# Patient Record
Sex: FEMALE | Race: WHITE | URBAN - NONMETROPOLITAN AREA
[De-identification: names, ages, dates, MRNs, and addresses within clinical notes are randomized per-mention and may not be internally consistent; named-entity substitution may affect disease eponyms.]

---

## 2017-05-11 ENCOUNTER — OFFICE VISIT (OUTPATIENT)
Dept: FAMILY MEDICINE CLINIC | Facility: CLINIC | Age: 22
End: 2017-05-11

## 2017-05-11 VITALS
BODY MASS INDEX: 29 KG/M2 | TEMPERATURE: 98 F | OXYGEN SATURATION: 99 % | SYSTOLIC BLOOD PRESSURE: 122 MMHG | DIASTOLIC BLOOD PRESSURE: 76 MMHG | WEIGHT: 177 LBS | HEART RATE: 68 BPM

## 2017-05-11 DIAGNOSIS — J01.40 ACUTE NON-RECURRENT PANSINUSITIS: Primary | ICD-10-CM

## 2017-05-11 PROCEDURE — 99213 OFFICE O/P EST LOW 20 MIN: CPT | Performed by: FAMILY MEDICINE

## 2017-05-11 RX ORDER — AMOXICILLIN 875 MG/1
875 TABLET, COATED ORAL 2 TIMES DAILY
Qty: 20 TABLET | Refills: 0 | Status: SHIPPED
Start: 2017-05-11 | End: 2017-05-21

## 2017-05-11 NOTE — PATIENT INSTRUCTIONS
Push fluids, nasal saline ad garland. , if in adequate congestion relief, may use Afrin 12 hour decongestant nasal spray or equivalent twice daily for the next 3 days  Amoxicillin 875 mg twice daily ×10 days, take with food

## 2017-05-11 NOTE — PROGRESS NOTES
HPI:   Rockwell Boxer is a 25year old female who presents for upper respiratory symptoms for  5  days. Patient reports sore throat, congestion, dry cough, cough is not keeping pt up at night, ear pain, sinus pain.       No current outpatient prescriptions nasal saline ad garland. , if in adequate congestion relief, may use Afrin 12 hour decongestant nasal spray or equivalent twice daily for the next 3 days  Amoxicillin 875 mg twice daily ×10 days, take with food    symptomatic treatment reviewed  Infection contr

## 2017-05-18 ENCOUNTER — OFFICE VISIT (OUTPATIENT)
Dept: FAMILY MEDICINE CLINIC | Facility: CLINIC | Age: 22
End: 2017-05-18

## 2017-05-18 VITALS
WEIGHT: 175.38 LBS | SYSTOLIC BLOOD PRESSURE: 122 MMHG | BODY MASS INDEX: 29 KG/M2 | TEMPERATURE: 98 F | DIASTOLIC BLOOD PRESSURE: 60 MMHG

## 2017-05-18 DIAGNOSIS — N39.0 URINARY TRACT INFECTION, SITE UNSPECIFIED: Primary | ICD-10-CM

## 2017-05-18 PROCEDURE — 81003 URINALYSIS AUTO W/O SCOPE: CPT | Performed by: INTERNAL MEDICINE

## 2017-05-18 PROCEDURE — 87186 SC STD MICRODIL/AGAR DIL: CPT | Performed by: INTERNAL MEDICINE

## 2017-05-18 PROCEDURE — 99214 OFFICE O/P EST MOD 30 MIN: CPT | Performed by: INTERNAL MEDICINE

## 2017-05-18 PROCEDURE — 87088 URINE BACTERIA CULTURE: CPT | Performed by: INTERNAL MEDICINE

## 2017-05-18 PROCEDURE — 87086 URINE CULTURE/COLONY COUNT: CPT | Performed by: INTERNAL MEDICINE

## 2017-05-18 RX ORDER — SULFAMETHOXAZOLE AND TRIMETHOPRIM 800; 160 MG/1; MG/1
1 TABLET ORAL 2 TIMES DAILY
Qty: 6 TABLET | Refills: 0 | Status: SHIPPED | OUTPATIENT
Start: 2017-05-18 | End: 2017-06-09 | Stop reason: ALTCHOICE

## 2017-05-19 NOTE — PROGRESS NOTES
Mana Coppola is a 25year old female. HPI:   Patient presents with symptoms of UTI. Complaining of urinary frequency, urgency, and dysuria. Denies back pain, fever, hematuria.       Current Outpatient Prescriptions:  Sulfamethoxazole-TMP DS (BACTRIM DS)

## 2017-06-08 ENCOUNTER — TELEPHONE (OUTPATIENT)
Dept: FAMILY MEDICINE CLINIC | Facility: CLINIC | Age: 22
End: 2017-06-08

## 2017-06-08 NOTE — TELEPHONE ENCOUNTER
Mom states that pt cannot make the previously scheduled 11:30 appointment.  Mom requested a 2pm or later appointment  Future Appointments  Date Time Provider Sushila Reyes   6/9/2017 2:00 PM Gill Chau DO [facility] [place]

## 2017-06-09 ENCOUNTER — OFFICE VISIT (OUTPATIENT)
Dept: FAMILY MEDICINE CLINIC | Facility: CLINIC | Age: 22
End: 2017-06-09

## 2017-06-09 VITALS
DIASTOLIC BLOOD PRESSURE: 80 MMHG | BODY MASS INDEX: 29 KG/M2 | WEIGHT: 177 LBS | OXYGEN SATURATION: 98 % | HEART RATE: 70 BPM | TEMPERATURE: 98 F | SYSTOLIC BLOOD PRESSURE: 110 MMHG

## 2017-06-09 DIAGNOSIS — R39.15 URINARY URGENCY: Primary | ICD-10-CM

## 2017-06-09 PROCEDURE — 81003 URINALYSIS AUTO W/O SCOPE: CPT | Performed by: FAMILY MEDICINE

## 2017-06-09 PROCEDURE — 87086 URINE CULTURE/COLONY COUNT: CPT | Performed by: FAMILY MEDICINE

## 2017-06-09 PROCEDURE — 99213 OFFICE O/P EST LOW 20 MIN: CPT | Performed by: FAMILY MEDICINE

## 2017-06-09 RX ORDER — NITROFURANTOIN 25; 75 MG/1; MG/1
100 CAPSULE ORAL 2 TIMES DAILY
Qty: 14 CAPSULE | Refills: 0 | Status: SHIPPED | OUTPATIENT
Start: 2017-06-09 | End: 2017-08-03 | Stop reason: ALTCHOICE

## 2017-06-09 NOTE — PROGRESS NOTES
Dawn Oswald is a 25year old female. Patient presents with: Other: urinary frequency, urgency--started last night at work. .... [facility] room 3      HPI:     Patient presents with symptoms of UTI.  Complaining of urinary frequency, urgency, dysuria, suprapubic p increasing fluid intake and bladder emptying . The patient indicates understanding of these issues and agrees to the plan. The patient is asked to return in 3 days if not better. Call if fever, vomiting, worsening symptoms. Urine sent for culture . Mateusz moran

## 2017-06-12 NOTE — PROGRESS NOTES
Quick Note:    Notify urine culture negative. Discontinue antibiotics.   RTC if still symptomatic.    ______

## 2017-08-03 ENCOUNTER — OFFICE VISIT (OUTPATIENT)
Dept: FAMILY MEDICINE CLINIC | Facility: CLINIC | Age: 22
End: 2017-08-03

## 2017-08-03 ENCOUNTER — TELEPHONE (OUTPATIENT)
Dept: FAMILY MEDICINE CLINIC | Facility: CLINIC | Age: 22
End: 2017-08-03

## 2017-08-03 VITALS
OXYGEN SATURATION: 95 % | WEIGHT: 170.13 LBS | BODY MASS INDEX: 27.67 KG/M2 | HEIGHT: 65.75 IN | TEMPERATURE: 98 F | SYSTOLIC BLOOD PRESSURE: 120 MMHG | DIASTOLIC BLOOD PRESSURE: 78 MMHG | HEART RATE: 86 BPM

## 2017-08-03 DIAGNOSIS — B30.9 ACUTE VIRAL CONJUNCTIVITIS OF BOTH EYES: Primary | ICD-10-CM

## 2017-08-03 PROCEDURE — 99213 OFFICE O/P EST LOW 20 MIN: CPT | Performed by: FAMILY MEDICINE

## 2017-08-03 RX ORDER — TOBRAMYCIN AND DEXAMETHASONE 3; 1 MG/ML; MG/ML
2 SUSPENSION/ DROPS OPHTHALMIC
Qty: 1 BOTTLE | Refills: 0 | Status: SHIPPED | OUTPATIENT
Start: 2017-08-03 | End: 2017-10-27 | Stop reason: ALTCHOICE

## 2017-08-03 NOTE — TELEPHONE ENCOUNTER
Mom states that pt had some redness and oozing out of the right eye. Mom states that this morning the eye was crusted over and is very itchy. Mom is requesting pt be seen by Dr. Brinda Hudson or available provider.   Mom advised that message will be forwarded to

## 2017-08-03 NOTE — TELEPHONE ENCOUNTER
Future Appointments  Date Time Provider Sushila Reyes   8/3/2017 9:45 AM Pedro Sample, DO [facility] [place]

## 2017-08-03 NOTE — PROGRESS NOTES
HPI:    Patient ID: Aminah Morgan is a 25year old female. Eye drainage / matting  Pain  X 2 days  HPI    Review of Systems   Constitutional: Negative for chills and fever. HENT: Positive for rhinorrhea. Negative for sore throat.     Respiratory: Negati

## 2017-08-15 ENCOUNTER — TELEPHONE (OUTPATIENT)
Dept: FAMILY MEDICINE CLINIC | Facility: CLINIC | Age: 22
End: 2017-08-15

## 2017-08-15 RX ORDER — TOBRAMYCIN 3 MG/ML
SOLUTION/ DROPS OPHTHALMIC
Qty: 5 ML | Refills: 0 | Status: SHIPPED | OUTPATIENT
Start: 2017-08-15 | End: 2017-10-27 | Stop reason: ALTCHOICE

## 2017-08-15 NOTE — TELEPHONE ENCOUNTER
meds for pink eye to [facility] in [place]    Patient is done with drops but feels like she still has pink eye

## 2017-08-15 NOTE — TELEPHONE ENCOUNTER
Pt saw Dr. Citlalli Tomas on 8/3 for conjunctivitis. Pt used tobramycin-dexamethasone 2 gtts in B eyes q4hrs w/a U1CWFR. Mom reports sx resolved, but then returned yesterday. Eye red and with \"ggopy\" drng.   Pt used leftover eye gtts last night w/ minimal im

## 2017-08-15 NOTE — TELEPHONE ENCOUNTER
Okay for Tobrex ophthalmic solution 1 drop affected eye 4 times daily for up to 5 days, 5 mL's, if redness and irritation persists, I would like to see her or she should see her eye doctor.   Do not wear contacts until completely resolved

## 2017-10-27 ENCOUNTER — OFFICE VISIT (OUTPATIENT)
Dept: FAMILY MEDICINE CLINIC | Facility: CLINIC | Age: 22
End: 2017-10-27

## 2017-10-27 ENCOUNTER — LAB ENCOUNTER (OUTPATIENT)
Dept: LAB | Age: 22
End: 2017-10-27
Attending: FAMILY MEDICINE
Payer: COMMERCIAL

## 2017-10-27 VITALS
TEMPERATURE: 98 F | DIASTOLIC BLOOD PRESSURE: 60 MMHG | BODY MASS INDEX: 28.13 KG/M2 | SYSTOLIC BLOOD PRESSURE: 118 MMHG | HEART RATE: 70 BPM | OXYGEN SATURATION: 99 % | HEIGHT: 66 IN | WEIGHT: 175 LBS

## 2017-10-27 DIAGNOSIS — Z13.1 SCREENING FOR DIABETES MELLITUS: ICD-10-CM

## 2017-10-27 DIAGNOSIS — Z13.29 SCREENING FOR THYROID DISORDER: ICD-10-CM

## 2017-10-27 DIAGNOSIS — R11.0 NAUSEA: ICD-10-CM

## 2017-10-27 DIAGNOSIS — R53.83 FATIGUE, UNSPECIFIED TYPE: ICD-10-CM

## 2017-10-27 DIAGNOSIS — R53.83 FATIGUE, UNSPECIFIED TYPE: Primary | ICD-10-CM

## 2017-10-27 PROCEDURE — 99213 OFFICE O/P EST LOW 20 MIN: CPT | Performed by: FAMILY MEDICINE

## 2017-10-27 PROCEDURE — 83036 HEMOGLOBIN GLYCOSYLATED A1C: CPT | Performed by: FAMILY MEDICINE

## 2017-10-27 PROCEDURE — 80050 GENERAL HEALTH PANEL: CPT | Performed by: FAMILY MEDICINE

## 2017-10-27 PROCEDURE — 36415 COLL VENOUS BLD VENIPUNCTURE: CPT | Performed by: FAMILY MEDICINE

## 2017-10-27 NOTE — PROGRESS NOTES
Yaniv Solorzano is a 23year old female. Patient presents with:  Fatigue: FEELS \"SICK\" WHEN SHE EATS SUGAR-  her with mother, would like blood work  HPI:   Tired the past few weeks, feels \"sick\" when eating sugar, nausea, coffee, ice tea  No change in alyse without murmur, peripheral pulses intact  GI: good BS's,no masses, HSM or tenderness  Psych: Good eye contact, bright affect, good insight, need appearance  ASSESSMENT AND PLAN:   Fatigue, unspecified type  (primary encounter diagnosis)  Nausea  Screening

## 2017-10-27 NOTE — PATIENT INSTRUCTIONS
I reviewed various causes of fatigue including metabolic disorders, stress, sleep-related disorders, etc.  We will check fasting CMP, CBC, TSH and hemoglobin A1c  Discussed dietary modification to avoid simple carbohydrates.   Would recommend a trial of the

## 2018-05-16 ENCOUNTER — OFFICE VISIT (OUTPATIENT)
Dept: FAMILY MEDICINE CLINIC | Facility: CLINIC | Age: 23
End: 2018-05-16

## 2018-05-16 VITALS
BODY MASS INDEX: 25.68 KG/M2 | TEMPERATURE: 98 F | SYSTOLIC BLOOD PRESSURE: 120 MMHG | HEIGHT: 65.5 IN | DIASTOLIC BLOOD PRESSURE: 68 MMHG | WEIGHT: 156 LBS | OXYGEN SATURATION: 99 % | HEART RATE: 74 BPM

## 2018-05-16 DIAGNOSIS — Z11.1 SCREENING FOR TUBERCULOSIS: ICD-10-CM

## 2018-05-16 DIAGNOSIS — Z02.0 SCHOOL PHYSICAL EXAM: Primary | ICD-10-CM

## 2018-05-16 DIAGNOSIS — M22.2X1 PATELLOFEMORAL ARTHRALGIA OF BOTH KNEES: ICD-10-CM

## 2018-05-16 DIAGNOSIS — M22.2X2 PATELLOFEMORAL ARTHRALGIA OF BOTH KNEES: ICD-10-CM

## 2018-05-16 PROCEDURE — 99395 PREV VISIT EST AGE 18-39: CPT | Performed by: FAMILY MEDICINE

## 2018-05-16 PROCEDURE — 86580 TB INTRADERMAL TEST: CPT | Performed by: FAMILY MEDICINE

## 2018-05-16 NOTE — H&P
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     Wt Readings from Last 6 Encounters:  05/16/18 : 156 lb  10/27/17 : 175 lb  08/03/17 : 170 lb 2 oz (92 %, Z= 1.38)* denies back pain, +bilateral knee discomfort when squatting  NEURO: denies headaches, tremors, dizziness, numbness, tingling, muscular weakness  PSYCHE: denies depression or anxiety, denies any sleep difficulty  HEMATOLOGIC: denies unexplained bruising or intradermal test  Meds & Refills for this Visit:  No prescriptions requested or ordered in this encounter  Imaging & Consults:  None  No Follow-up on file. Patient Instructions   I reviewed age-appropriate immunizations.   I discussed HPV infection and vac

## 2018-05-16 NOTE — PATIENT INSTRUCTIONS
I reviewed age-appropriate immunizations. I discussed HPV infection and vaccine, hepatitis A infection vaccine, patient reminded she will be due for a Tdap booster within the next year.   Patient wishes to defer all vaccines today but would consider hepati

## 2018-05-23 ENCOUNTER — NURSE ONLY (OUTPATIENT)
Dept: FAMILY MEDICINE CLINIC | Facility: CLINIC | Age: 23
End: 2018-05-23

## 2018-05-23 DIAGNOSIS — Z11.1 SCREENING FOR TUBERCULOSIS: Primary | ICD-10-CM

## 2018-05-23 PROCEDURE — 86580 TB INTRADERMAL TEST: CPT | Performed by: FAMILY MEDICINE

## 2018-05-25 ENCOUNTER — TELEPHONE (OUTPATIENT)
Dept: FAMILY MEDICINE CLINIC | Facility: CLINIC | Age: 23
End: 2018-05-25

## 2018-05-25 NOTE — TELEPHONE ENCOUNTER
See  below. Pt was scheduled for her 2nd PPD reading today, but got stuck at work, and can't come in tomorrow either. She will have a nurse @ [address] [place] [address] Dr document her reading. IF SHE BRINGS THAT IN, CAN WE TRANSFER IT ONTO PT'S FORM FOR SCHOOL?   *IF SO, THE

## 2018-05-25 NOTE — TELEPHONE ENCOUNTER
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

## 2018-06-06 ENCOUNTER — NURSE ONLY (OUTPATIENT)
Dept: FAMILY MEDICINE CLINIC | Facility: CLINIC | Age: 23
End: 2018-06-06

## 2018-06-06 DIAGNOSIS — Z11.1 SCREENING FOR TUBERCULOSIS: Primary | ICD-10-CM

## 2018-06-06 PROCEDURE — 86580 TB INTRADERMAL TEST: CPT | Performed by: FAMILY MEDICINE

## 2018-06-06 NOTE — TELEPHONE ENCOUNTER
Pt came into our ofc on 6/6. States she did NOT have her 2nd step PPD read at work as discussed in note below. PT ASKS TO HAVE IT PLACED AGAIN TODAY.    PT'S PAPERWORK DAYS \"A 2-STEP REQUIRES 2 TUBERCULIN INJECTIONS REPEATED BETWEEN 1-3 WEEKS\" AND \"#2

## 2018-06-08 ENCOUNTER — NURSE ONLY (OUTPATIENT)
Dept: FAMILY MEDICINE CLINIC | Facility: CLINIC | Age: 23
End: 2018-06-08

## 2018-06-08 DIAGNOSIS — Z11.1 SCREENING FOR TUBERCULOSIS: Primary | ICD-10-CM

## 2018-10-05 ENCOUNTER — OFFICE VISIT (OUTPATIENT)
Dept: FAMILY MEDICINE CLINIC | Facility: CLINIC | Age: 23
End: 2018-10-05
Payer: COMMERCIAL

## 2018-10-05 VITALS
DIASTOLIC BLOOD PRESSURE: 60 MMHG | OXYGEN SATURATION: 99 % | BODY MASS INDEX: 26 KG/M2 | TEMPERATURE: 98 F | HEART RATE: 76 BPM | WEIGHT: 159 LBS | SYSTOLIC BLOOD PRESSURE: 102 MMHG

## 2018-10-05 DIAGNOSIS — M26.609 TMJ DYSFUNCTION: ICD-10-CM

## 2018-10-05 DIAGNOSIS — R51.9 HEADACHE DISORDER: ICD-10-CM

## 2018-10-05 DIAGNOSIS — K21.9 GASTROESOPHAGEAL REFLUX DISEASE WITHOUT ESOPHAGITIS: Primary | ICD-10-CM

## 2018-10-05 PROCEDURE — 99214 OFFICE O/P EST MOD 30 MIN: CPT | Performed by: FAMILY MEDICINE

## 2018-10-05 RX ORDER — NORETHINDRONE ACETATE AND ETHINYL ESTRADIOL, ETHINYL ESTRADIOL AND FERROUS FUMARATE 1MG-10(24)
1 KIT ORAL
Refills: 3 | COMMUNITY
Start: 2018-08-21 | End: 2019-05-14 | Stop reason: ALTCHOICE

## 2018-10-05 NOTE — PROGRESS NOTES
HPI:   Nate Watson is a 26year old female who presents for upper respiratory symptoms for  5  days. Patient reports sore throat, dry cough, ear pain, epigastric cramping, denies fever.   No nsaids, no alcohol, +lots of caffeine until last 2 days when he is a 26year old female who presents with   Gastroesophageal reflux disease without esophagitis  (primary encounter diagnosis)  Tmj dysfunction  Headache disorder  No orders of the defined types were placed in this encounter.     Meds & Refills for this [facility]

## 2018-10-05 NOTE — PATIENT INSTRUCTIONS
Discussed various causes of headache. I feel her headache and several contributing factors including caffeine withdrawal and muscle tension due to recent dental manipulation. Anti-reflux measures reviewed. Avoid large meals.  Allow at least 3 hrs between

## 2018-12-10 ENCOUNTER — OFFICE VISIT (OUTPATIENT)
Dept: FAMILY MEDICINE CLINIC | Facility: CLINIC | Age: 23
End: 2018-12-10
Payer: COMMERCIAL

## 2018-12-10 VITALS
BODY MASS INDEX: 25.68 KG/M2 | SYSTOLIC BLOOD PRESSURE: 106 MMHG | WEIGHT: 156 LBS | OXYGEN SATURATION: 98 % | HEART RATE: 70 BPM | HEIGHT: 65.25 IN | DIASTOLIC BLOOD PRESSURE: 62 MMHG | TEMPERATURE: 98 F

## 2018-12-10 DIAGNOSIS — H10.13 ACUTE ATOPIC CONJUNCTIVITIS OF BOTH EYES: Primary | ICD-10-CM

## 2018-12-10 PROCEDURE — 99213 OFFICE O/P EST LOW 20 MIN: CPT | Performed by: FAMILY MEDICINE

## 2018-12-10 RX ORDER — OLOPATADINE HYDROCHLORIDE 2 MG/ML
1 SOLUTION/ DROPS OPHTHALMIC DAILY
Qty: 2.5 ML | Refills: 0 | Status: SHIPPED
Start: 2018-12-10 | End: 2019-05-14 | Stop reason: ALTCHOICE

## 2018-12-10 NOTE — PROGRESS NOTES
Hunter Hernandez is a 26year old female. Patient presents with:  Eye Problem: reddness in eye started this morning. 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 Imaging & Consults:  None  No Follow-up on file.   Patient Instructions   I discussed the likely irritant infl

## 2018-12-10 NOTE — PATIENT INSTRUCTIONS
I discussed the likely irritant inflammation of the eyes as opposed to an infectious process  Would recommend keeping contacts out until redness has resolved  Discussed typical irritants and allergens such as cigarette smoke and avoidance strategies  Patie

## 2019-01-10 ENCOUNTER — MED REC SCAN ONLY (OUTPATIENT)
Dept: FAMILY MEDICINE CLINIC | Facility: CLINIC | Age: 24
End: 2019-01-10

## 2019-01-15 ENCOUNTER — TELEPHONE (OUTPATIENT)
Dept: FAMILY MEDICINE CLINIC | Facility: CLINIC | Age: 24
End: 2019-01-15

## 2019-01-15 NOTE — TELEPHONE ENCOUNTER
See below (mom hasn't been dx'd yet-----she is coming in on Wed.  For repeat FBS and A1c, then a cpx on Friday)

## 2019-01-15 NOTE — TELEPHONE ENCOUNTER
Patient was tested a year ago and had no evidence of diabetes.   If she would like to be retested, would check a hemoglobin A1c

## 2019-02-04 ENCOUNTER — OFFICE VISIT (OUTPATIENT)
Dept: FAMILY MEDICINE CLINIC | Facility: CLINIC | Age: 24
End: 2019-02-04
Payer: COMMERCIAL

## 2019-02-04 VITALS
RESPIRATION RATE: 15 BRPM | BODY MASS INDEX: 26.83 KG/M2 | TEMPERATURE: 98 F | WEIGHT: 163 LBS | HEIGHT: 65.25 IN | DIASTOLIC BLOOD PRESSURE: 66 MMHG | SYSTOLIC BLOOD PRESSURE: 110 MMHG | HEART RATE: 73 BPM

## 2019-02-04 DIAGNOSIS — H15.103 EPISCLERITIS OF BOTH EYES: Primary | ICD-10-CM

## 2019-02-04 PROCEDURE — 99213 OFFICE O/P EST LOW 20 MIN: CPT | Performed by: FAMILY MEDICINE

## 2019-02-04 RX ORDER — TOBRAMYCIN AND DEXAMETHASONE 3; 1 MG/ML; MG/ML
1 SUSPENSION/ DROPS OPHTHALMIC 4 TIMES DAILY
Qty: 2.5 ML | Refills: 0 | Status: SHIPPED
Start: 2019-02-04 | End: 2019-02-09

## 2019-02-04 NOTE — PATIENT INSTRUCTIONS
I advised patient to change her contacts and did not use them until inflammation and irritation are resolved  TobraDex 1 drop each eye 4 times daily for up to the next 5 days  Good handwashing reviewed

## 2019-02-04 NOTE — PROGRESS NOTES
HPI:    Patient ID: Diaz Sandhu is a 26year old female. Patient presents with:  Conjunctivitis: Both eyes are watering and panful. Onset 1 day    Sister had pink eye 2 weeks ago  Works at a bar, eyes red and sensitive, slept with contacts in last nig 97.8 °F (36.6 °C), temperature source Temporal, resp. rate 15, height 65.25\", weight 163 lb.          ASSESSMENT/PLAN:   Episcleritis of both eyes  (primary encounter diagnosis)  Patient Instructions   I advised patient to change her contacts and did not u

## 2019-05-14 ENCOUNTER — OFFICE VISIT (OUTPATIENT)
Dept: FAMILY MEDICINE CLINIC | Facility: CLINIC | Age: 24
End: 2019-05-14
Payer: COMMERCIAL

## 2019-05-14 VITALS
BODY MASS INDEX: 24 KG/M2 | OXYGEN SATURATION: 98 % | WEIGHT: 143 LBS | SYSTOLIC BLOOD PRESSURE: 98 MMHG | DIASTOLIC BLOOD PRESSURE: 70 MMHG | TEMPERATURE: 99 F | HEART RATE: 78 BPM

## 2019-05-14 DIAGNOSIS — B07.8 OTHER VIRAL WARTS: Primary | ICD-10-CM

## 2019-05-14 DIAGNOSIS — R22.32 MASS OF FINGER OF LEFT HAND: ICD-10-CM

## 2019-05-14 PROCEDURE — 99213 OFFICE O/P EST LOW 20 MIN: CPT | Performed by: FAMILY MEDICINE

## 2019-05-14 RX ORDER — NORETHINDRONE ACETATE AND ETHINYL ESTRADIOL AND FERROUS FUMARATE 1MG-20(21)
1 KIT ORAL
Refills: 1 | COMMUNITY
Start: 2019-04-04

## 2019-05-14 NOTE — PATIENT INSTRUCTIONS
I advised patient that I suspect the swelling of her left third finger is related to repetitive use. 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   I would recommend follow-up if she develops any numbness or wea

## 2019-05-14 NOTE — PROGRESS NOTES
HPI:    Patient ID: Martín Mario is a 26year old female. Patient presents with:  Bump: left hand third finger. no pain. 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   She noticed a painless bump I reviewed the viral nature of warts as well as conservative management. Given the pain sensitive location of the warts I would recommend use of self treatment with topical acid.   Discussed importance of periodic debridement and duration of treatment thro

## 2019-08-28 ENCOUNTER — OFFICE VISIT (OUTPATIENT)
Dept: FAMILY MEDICINE CLINIC | Facility: CLINIC | Age: 24
End: 2019-08-28
Payer: COMMERCIAL

## 2019-08-28 VITALS
TEMPERATURE: 98 F | HEIGHT: 65.25 IN | DIASTOLIC BLOOD PRESSURE: 64 MMHG | HEART RATE: 78 BPM | RESPIRATION RATE: 20 BRPM | BODY MASS INDEX: 27.33 KG/M2 | SYSTOLIC BLOOD PRESSURE: 104 MMHG | OXYGEN SATURATION: 98 % | WEIGHT: 166 LBS

## 2019-08-28 DIAGNOSIS — J30.1 SEASONAL ALLERGIC RHINITIS DUE TO POLLEN: ICD-10-CM

## 2019-08-28 DIAGNOSIS — J02.9 ACUTE PHARYNGITIS, UNSPECIFIED ETIOLOGY: Primary | ICD-10-CM

## 2019-08-28 LAB — CONTROL LINE PRESENT WITH A CLEAR BACKGROUND (YES/NO): YES YES/NO

## 2019-08-28 PROCEDURE — 87880 STREP A ASSAY W/OPTIC: CPT | Performed by: NURSE PRACTITIONER

## 2019-08-28 PROCEDURE — 99213 OFFICE O/P EST LOW 20 MIN: CPT | Performed by: NURSE PRACTITIONER

## 2019-08-28 PROCEDURE — 87081 CULTURE SCREEN ONLY: CPT | Performed by: NURSE PRACTITIONER

## 2019-08-28 NOTE — PROGRESS NOTES
HPI:   Sore Throat    This is a new problem. Episode onset: Saturday. Associated symptoms include coughing and trouble swallowing (it hurts).  Pertinent negatives include no congestion, ear discharge, ear pain, headaches, hoarse voice, neck pain, shortnes mucous membranes are normal. Posterior oropharyngeal erythema (mildly) present. No oropharyngeal exudate, posterior oropharyngeal edema or tonsillar abscesses. Cardiovascular: Normal rate, regular rhythm and normal heart sounds.     Pulmonary/Chest: Effor

## 2020-01-15 ENCOUNTER — TELEPHONE (OUTPATIENT)
Dept: FAMILY MEDICINE CLINIC | Facility: CLINIC | Age: 25
End: 2020-01-15

## 2020-01-15 NOTE — TELEPHONE ENCOUNTER
L/m for mom that pt would need to be seen.    Advised to schedule appt with Dr. Mikey Johnson when he returns to the office OR she can see NP

## 2020-01-15 NOTE — TELEPHONE ENCOUNTER
Has been feeling tired lately-asking if she can get her iron tested. Has been working long hours at work.   Not due for physical until May

## 2020-01-18 ENCOUNTER — TELEPHONE (OUTPATIENT)
Dept: FAMILY MEDICINE CLINIC | Facility: CLINIC | Age: 25
End: 2020-01-18

## 2020-09-29 ENCOUNTER — TELEPHONE (OUTPATIENT)
Dept: FAMILY MEDICINE CLINIC | Facility: CLINIC | Age: 25
End: 2020-09-29

## 2020-09-29 NOTE — TELEPHONE ENCOUNTER
Virtual/Telephone Check-In    Timo Broussard verbally consents to a Virtual/Telephone Check-In service on 09/30/2020. Patient has been referred to the [facility] website at [email or web address] to review the yearly Consent to Treat document.   Patient unders

## 2020-09-30 ENCOUNTER — TELEMEDICINE (OUTPATIENT)
Dept: FAMILY MEDICINE CLINIC | Facility: CLINIC | Age: 25
End: 2020-09-30
Payer: COMMERCIAL

## 2020-09-30 DIAGNOSIS — M79.10 MYALGIA: Primary | ICD-10-CM

## 2020-09-30 DIAGNOSIS — R53.83 FATIGUE, UNSPECIFIED TYPE: ICD-10-CM

## 2020-09-30 PROCEDURE — 99213 OFFICE O/P EST LOW 20 MIN: CPT | Performed by: FAMILY MEDICINE

## 2020-09-30 NOTE — PROGRESS NOTES
Martín Mario is a 23year old female. Telehealth outside of [facility] [address] Verbal Consent   I conducted a telehealth visit with Martín Mario today, 09/30/20, which was completed using two-way, real-time interactive audio and video communication.  Abby Martínez She denies any documented fever, cough, diarrhea, change in sense of taste or smell. She did admit to a mild sore throat as well. She is a surgical tech at [employer].  She was tested on 9/27 with a negative COVID-19 result.   Current Outpatient patient may return to work pending resolution of symptoms. The patient indicates understanding of these issues and agrees to the plan. Everett Mishra.  Bryan Hernandez, PRINCEFP

## 2020-09-30 NOTE — PATIENT INSTRUCTIONS
I reviewed signs and symptoms of coronavirus infection as well as CDC recommendations for testing, infection control measures and quarantine. I would recommend patient be retested.   An order was faxed to [facility] at patient's request

## 2020-10-02 ENCOUNTER — TELEPHONE (OUTPATIENT)
Dept: FAMILY MEDICINE CLINIC | Facility: CLINIC | Age: 25
End: 2020-10-02

## 2020-10-02 NOTE — TELEPHONE ENCOUNTER
Please advise patient of negative COVID-19 test.  Treat symptoms specific      Toya White.  Miguelangel Wood

## 2020-10-03 NOTE — TELEPHONE ENCOUNTER
Pt advised. NAHOMY-----pt states she thinks she has mono. States that her sister's boyfriends brother has it, and then the sister's boyfriend is now sick and she \"shared a drink with him\".     Reports she is not as achy now-----main complaint is fatigue, \

## 2020-10-05 ENCOUNTER — OFFICE VISIT (OUTPATIENT)
Dept: FAMILY MEDICINE CLINIC | Facility: CLINIC | Age: 25
End: 2020-10-05
Payer: COMMERCIAL

## 2020-10-05 VITALS
SYSTOLIC BLOOD PRESSURE: 120 MMHG | OXYGEN SATURATION: 98 % | TEMPERATURE: 99 F | BODY MASS INDEX: 26 KG/M2 | HEART RATE: 91 BPM | DIASTOLIC BLOOD PRESSURE: 80 MMHG | WEIGHT: 156 LBS

## 2020-10-05 DIAGNOSIS — R53.83 FATIGUE, UNSPECIFIED TYPE: Primary | ICD-10-CM

## 2020-10-05 PROCEDURE — 99213 OFFICE O/P EST LOW 20 MIN: CPT | Performed by: FAMILY MEDICINE

## 2020-10-05 PROCEDURE — 3074F SYST BP LT 130 MM HG: CPT | Performed by: FAMILY MEDICINE

## 2020-10-05 PROCEDURE — 3079F DIAST BP 80-89 MM HG: CPT | Performed by: FAMILY MEDICINE

## 2020-10-05 NOTE — PROGRESS NOTES
HPI:    Patient ID: Blaine Parish is a 23year old female. Patient presents with: Follow - Up: [facility], neg mono, neg COVID 10/3    Patient states that her boyfriend had a lunch with an individual who was culture +10days ago.   He was with her 2 weekends a distal pulses. No murmur heard. Pulmonary/Chest: Effort normal and breath sounds normal.   Abdominal: Soft. Bowel sounds are normal. There is no hepatosplenomegaly. There is no abdominal tenderness. Lymphadenopathy:     She has no cervical adenopathy.

## 2020-10-05 NOTE — PATIENT INSTRUCTIONS
I reviewed available records including labs. As patient symptoms have completely resolved, note was written for her to return to work without restrictions tomorrow. Monitor clinically. Patient may benefit from a daily multivitamin with iron.   If symptom

## 2020-11-13 ENCOUNTER — TELEPHONE (OUTPATIENT)
Dept: FAMILY MEDICINE CLINIC | Facility: CLINIC | Age: 25
End: 2020-11-13

## 2020-11-13 DIAGNOSIS — R05.9 COUGH: Primary | ICD-10-CM

## 2020-11-13 NOTE — TELEPHONE ENCOUNTER
Spoke with mother who states patient's sister who works at [employer] in surgery was offered by her job to be tested for antibodies. Patient wants to be tested as well.  She is aware she tested negative for COVID, however there have been times that she had a coug

## 2020-11-13 NOTE — TELEPHONE ENCOUNTER
Pts. Mom calling for pt /Pt. Works up at [employer]. And she is wanting to get the antibody lab work done.  She is asking if Dr. Elidia Nowak would put in orders and have them faxed to [facility] lab so she can have the antibody test?

## 2023-03-10 NOTE — PROCEDURES
The office order for PCP removal request is Denied and finalized on March 10, 2023. Denied - Office to Verify PCP Status:   LOV 10/02/2020  1. Office must call patient to verify PCP status and request to schedule an appointment with provider. 2. If no response, Office should also send a letter to patient via mail and My-Chart requesting to schedule office visit with provider.     Thanks,  [facility] [employer]

## (undated) NOTE — MR AVS SNAPSHOT
Vince Hoffman  [address] [address]  [phone]               Thank you for choosing us for your health care visit with Chloe Prince. Rakesh Drake DO.   We are glad to serve you and happy to provide you with this sum Support Staff. Remember, [employer] is NOT to be used for urgent needs. For medical emergencies, dial 911. Visit [email or web address] to learn more.         Educational Information     Healthy Diet and Regular Exercise  [facility]

## (undated) NOTE — LETTER
[address] [id number]  [phone]          10/05/20    Barbi Mg  9/9/1995    To whom it may concern,    Kayla Stearns was evaluated today and may return to work withou

## (undated) NOTE — MR AVS SNAPSHOT
[facility]  [address] [address]  [phone]               Thank you for choosing us for your health care visit with Naomi Richmond DO.   We are glad to serve you and happy to provide you with this summ PROTEIN (URINE DIPSTICK) negative Negative/Trace mg/dL    UROBILINOGEN,SEMI-QN 0.2 0.0 - 1.9 mg/dL    NITRITE, URINE negative Negative    LEUKOCYTES negative Negative    APPEARANCE clear Clear    URINE-COLOR yellow Yellow    [employer] Lot# [id number] Num